# Patient Record
Sex: MALE | Race: WHITE | Employment: OTHER | URBAN - METROPOLITAN AREA
[De-identification: names, ages, dates, MRNs, and addresses within clinical notes are randomized per-mention and may not be internally consistent; named-entity substitution may affect disease eponyms.]

---

## 2022-12-12 ENCOUNTER — OFFICE VISIT (OUTPATIENT)
Dept: INTERNAL MEDICINE CLINIC | Age: 67
End: 2022-12-12
Payer: MEDICARE

## 2022-12-12 VITALS — HEIGHT: 65 IN

## 2022-12-12 DIAGNOSIS — Z00.00 WELL ADULT HEALTH CHECK: ICD-10-CM

## 2022-12-12 DIAGNOSIS — R41.3 MEMORY LOSS: Primary | ICD-10-CM

## 2022-12-12 DIAGNOSIS — I10 ESSENTIAL HYPERTENSION: ICD-10-CM

## 2022-12-12 DIAGNOSIS — H61.23 BILATERAL IMPACTED CERUMEN: ICD-10-CM

## 2022-12-12 PROCEDURE — G8427 DOCREV CUR MEDS BY ELIG CLIN: HCPCS | Performed by: INTERNAL MEDICINE

## 2022-12-12 PROCEDURE — G8484 FLU IMMUNIZE NO ADMIN: HCPCS | Performed by: INTERNAL MEDICINE

## 2022-12-12 PROCEDURE — 3017F COLORECTAL CA SCREEN DOC REV: CPT | Performed by: INTERNAL MEDICINE

## 2022-12-12 PROCEDURE — 1123F ACP DISCUSS/DSCN MKR DOCD: CPT | Performed by: INTERNAL MEDICINE

## 2022-12-12 PROCEDURE — G8421 BMI NOT CALCULATED: HCPCS | Performed by: INTERNAL MEDICINE

## 2022-12-12 PROCEDURE — 1036F TOBACCO NON-USER: CPT | Performed by: INTERNAL MEDICINE

## 2022-12-12 PROCEDURE — 99203 OFFICE O/P NEW LOW 30 MIN: CPT | Performed by: INTERNAL MEDICINE

## 2022-12-12 RX ORDER — TAMSULOSIN HYDROCHLORIDE 0.4 MG/1
CAPSULE ORAL
COMMUNITY
Start: 2022-10-11

## 2022-12-12 RX ORDER — LISINOPRIL 40 MG/1
TABLET ORAL
COMMUNITY
Start: 2022-10-11

## 2022-12-12 SDOH — ECONOMIC STABILITY: TRANSPORTATION INSECURITY
IN THE PAST 12 MONTHS, HAS LACK OF TRANSPORTATION KEPT YOU FROM MEETINGS, WORK, OR FROM GETTING THINGS NEEDED FOR DAILY LIVING?: NO

## 2022-12-12 SDOH — ECONOMIC STABILITY: FOOD INSECURITY: WITHIN THE PAST 12 MONTHS, THE FOOD YOU BOUGHT JUST DIDN'T LAST AND YOU DIDN'T HAVE MONEY TO GET MORE.: NEVER TRUE

## 2022-12-12 SDOH — ECONOMIC STABILITY: FOOD INSECURITY: WITHIN THE PAST 12 MONTHS, YOU WORRIED THAT YOUR FOOD WOULD RUN OUT BEFORE YOU GOT MONEY TO BUY MORE.: NEVER TRUE

## 2022-12-12 SDOH — ECONOMIC STABILITY: TRANSPORTATION INSECURITY
IN THE PAST 12 MONTHS, HAS THE LACK OF TRANSPORTATION KEPT YOU FROM MEDICAL APPOINTMENTS OR FROM GETTING MEDICATIONS?: NO

## 2022-12-12 ASSESSMENT — PATIENT HEALTH QUESTIONNAIRE - PHQ9
2. FEELING DOWN, DEPRESSED OR HOPELESS: 0
SUM OF ALL RESPONSES TO PHQ QUESTIONS 1-9: 0
SUM OF ALL RESPONSES TO PHQ QUESTIONS 1-9: 0
1. LITTLE INTEREST OR PLEASURE IN DOING THINGS: 0
SUM OF ALL RESPONSES TO PHQ QUESTIONS 1-9: 0
SUM OF ALL RESPONSES TO PHQ9 QUESTIONS 1 & 2: 0
SUM OF ALL RESPONSES TO PHQ QUESTIONS 1-9: 0

## 2022-12-12 ASSESSMENT — SOCIAL DETERMINANTS OF HEALTH (SDOH): HOW HARD IS IT FOR YOU TO PAY FOR THE VERY BASICS LIKE FOOD, HOUSING, MEDICAL CARE, AND HEATING?: NOT HARD AT ALL

## 2022-12-12 NOTE — PROGRESS NOTES
[address] [address]  Dept: [phone]  Dept Fax: [phone]    Syd Dupont is a 75 y.o. male who presents today for his medicalconditions/complaints as noted below. Syd Dupont is c/o of Memory Loss (Dementia for the past few years.), Foot Pain (Callus on the bottom of left foot. ), and Annual Exam ([facility] care. )      HPI:     Memory Loss    Patient reports onset of memory loss was more than 1 year ago (about two years). Onset quality is gradual.     Symptoms associated with memory loss include changes in short-term memory. Symptoms comments: Will forget where he is going. Telling time on analog clock, counting money. .     The family monitors medication usage. Medication administration comments: Wife gives him pills. Patient lives with spouse. Patient lives in a/an house. Foot Pain   The pain is present in the left foot (heel sole callous). This is a new problem. The current episode started more than 1 year ago. The problem occurs constantly. The quality of the pain is described as sharp. The pain is moderate. The symptoms are aggravated by activity. He has tried nothing for the symptoms. Hypertension  This is a chronic problem. The current episode started more than 1 year ago. The problem is unchanged. The problem is controlled. There are no associated agents to hypertension. Past treatments include ACE inhibitors and calcium channel blockers. The current treatment provides moderate improvement. Past Medical History:   Diagnosis Date    Hypertension         Current Outpatient Medications   Medication Sig Dispense Refill    lisinopril (PRINIVIL;ZESTRIL) 40 MG tablet       tamsulosin (FLOMAX) 0.4 MG capsule       verapamil (CALAN SR) 120 MG extended release tablet        No current facility-administered medications for this visit.      No Known Allergies    Health Maintenance   Topic Date Due    Depression Screen  Never done Hepatitis C screen  Never done    Lipids  Never done    Colorectal Cancer Screen  Never done    Shingles vaccine (1 of 2) Never done    DTaP/Tdap/Td vaccine (1 - Tdap) 09/21/2014    Pneumococcal 65+ years Vaccine (1 - PCV) Never done    COVID-19 Vaccine (2 - Booster for [employer] series) 06/05/2021    Annual Wellness Visit (AWV)  11/28/2022    Flu vaccine  Completed    Hepatitis A vaccine  Aged Out    Hib vaccine  Aged Out    Meningococcal (ACWY) vaccine  Aged Out       Subjective:      Review of Systems   Psychiatric/Behavioral:  Positive for memory loss. All other systems reviewed and are negative. Objective:     Physical Exam  Vitals reviewed. Constitutional:       Appearance: Normal appearance. He is well-developed. HENT:      Head: Normocephalic and atraumatic. Right Ear: There is impacted cerumen. Left Ear: There is impacted cerumen. Eyes:      Conjunctiva/sclera: Conjunctivae normal.      Pupils: Pupils are equal, round, and reactive to light. Neck:      Thyroid: No thyromegaly. Vascular: No JVD. Cardiovascular:      Rate and Rhythm: Normal rate and regular rhythm. Heart sounds: Normal heart sounds. No murmur heard. Pulmonary:      Effort: Pulmonary effort is normal.      Breath sounds: Normal breath sounds. Abdominal:      General: Bowel sounds are normal.      Palpations: Abdomen is soft. Musculoskeletal:         General: Normal range of motion. Cervical back: Normal range of motion and neck supple. Skin:     General: Skin is warm and dry. Neurological:      General: No focal deficit present. Mental Status: He is alert. Deep Tendon Reflexes: Reflexes are normal and symmetric. Comments: Doesn't know date, knows [place] is next holiday.  5' 4.75\" (1.645 m)       Assessment:       Diagnosis Orders   1. Memory loss  Sedimentation rate, automated    Folate    Vitamin B12    Tamica Daniels MD, Neurology, [place]      2.  Essential hypertension        3. Bilateral impacted cerumen        4. Well adult health check  Comprehensive Metabolic Panel    TSH with Reflex    VDRL          Plan:      No follow-ups on file. No orders of the defined types were placed in this encounter. Orders Placed This Encounter   Procedures    Comprehensive Metabolic Panel     Standing Status:   Future     Standing Expiration Date:   12/12/2023    TSH with Reflex     Standing Status:   Future     Standing Expiration Date:   12/12/2023    VDRL     Standing Status:   Future     Standing Expiration Date:   12/12/2023    Sedimentation rate, automated     Standing Status:   Future     Standing Expiration Date:   12/12/2023    Folate     Standing Status:   Future     Standing Expiration Date:   12/12/2023    Vitamin B12     Standing Status:   Future     Standing Expiration Date:   12/12/2023    Darlene Ulrich MD, Neurology, [place]     Referral Priority:   Routine     Referral Type:   Eval and Treat     Referral Reason:   Specialty Services Required     Referred to Provider:   Dora Primrose, MD     Requested Specialty:   Neurology     Number of Visits Requested:   1              Patient given educational materials - see patient instructions. Discussed use, benefit, and side effects of prescribed medications. All patientquestions answered. Pt voiced understanding.     Electronically signed by Nano Troy MD on 12/12/2022at 1:34 PM

## 2022-12-12 NOTE — PROGRESS NOTES
Visit Information    Have you changed or started any medications since your last visit including any over-the-counter medicines, vitamins, or herbal medicines? no   Are you having any side effects from any of your medications? -  no  Have you stopped taking any of your medications? Is so, why? -  no    Have you seen any other physician or provider since your last visit? No  Have you had any other diagnostic tests since your last visit? No  Have you been seen in the emergency room and/or had an admission to a hospital since we last saw you? No  Have you had your routine dental cleaning in the past 6 months? no    Have you activated your [employer] account? If not, what are your barriers?  Yes     Patient Care Team:  Daniel Andrade MD as PCP - General (Internal Medicine)    Medical History Review  Past Medical, Family, and Social History reviewed and does not contribute to the patient presenting condition    Health Maintenance   Topic Date Due    Depression Screen  Never done    Hepatitis C screen  Never done    Lipids  Never done    Colorectal Cancer Screen  Never done    Shingles vaccine (1 of 2) Never done    DTaP/Tdap/Td vaccine (1 - Tdap) 09/21/2014    Pneumococcal 65+ years Vaccine (1 - PCV) Never done    COVID-19 Vaccine (2 - Booster for [employer] series) 06/05/2021    Annual Wellness Visit (AWV)  11/28/2022    Flu vaccine  Completed    Hepatitis A vaccine  Aged Out    Hib vaccine  Aged Out    Meningococcal (ACWY) vaccine  Aged Out

## 2022-12-16 ENCOUNTER — NURSE ONLY (OUTPATIENT)
Dept: INTERNAL MEDICINE CLINIC | Age: 67
End: 2022-12-16
Payer: MEDICARE

## 2022-12-16 ENCOUNTER — HOSPITAL ENCOUNTER (OUTPATIENT)
Age: 67
Setting detail: SPECIMEN
Discharge: HOME | End: 2022-12-16

## 2022-12-16 VITALS — HEIGHT: 64 IN

## 2022-12-16 DIAGNOSIS — Z00.00 WELL ADULT HEALTH CHECK: ICD-10-CM

## 2022-12-16 DIAGNOSIS — R41.3 MEMORY LOSS: ICD-10-CM

## 2022-12-16 DIAGNOSIS — H61.23 BILATERAL IMPACTED CERUMEN: Primary | ICD-10-CM

## 2022-12-16 LAB — SEDIMENTATION RATE, ERYTHROCYTE: 1 MM/HR (ref 0–20)

## 2022-12-16 PROCEDURE — 69210 REMOVE IMPACTED EAR WAX UNI: CPT | Performed by: INTERNAL MEDICINE

## 2022-12-17 LAB
ALBUMIN SERPL-MCNC: 4.3 G/DL (ref 3.5–5.2)
ALBUMIN/GLOBULIN RATIO: 1.4 (ref 1–2.5)
ALP BLD-CCNC: 72 U/L (ref 40–129)
ALT SERPL-CCNC: 7 U/L (ref 5–41)
ANION GAP SERPL CALCULATED.3IONS-SCNC: 8 MMOL/L (ref 9–17)
AST SERPL-CCNC: 14 U/L
BILIRUB SERPL-MCNC: 1 MG/DL (ref 0.3–1.2)
BUN BLDV-MCNC: 6 MG/DL (ref 8–23)
CALCIUM SERPL-MCNC: 9.4 MG/DL (ref 8.6–10.4)
CHLORIDE BLD-SCNC: 96 MMOL/L (ref 98–107)
CO2: 30 MMOL/L (ref 20–31)
CREAT SERPL-MCNC: 0.71 MG/DL (ref 0.7–1.2)
FOLATE: 12.6 NG/ML
GFR SERPL CREATININE-BSD FRML MDRD: >60 ML/MIN/1.73M2
GLUCOSE BLD-MCNC: 86 MG/DL (ref 70–99)
POTASSIUM SERPL-SCNC: 4.3 MMOL/L (ref 3.7–5.3)
SODIUM BLD-SCNC: 134 MMOL/L (ref 135–144)
TOTAL PROTEIN: 7.3 G/DL (ref 6.4–8.3)
TSH SERPL DL<=0.05 MIU/L-ACNC: 0.86 UIU/ML (ref 0.3–5)
VITAMIN B-12: 462 PG/ML (ref 232–1245)

## 2023-01-09 ENCOUNTER — OFFICE VISIT (OUTPATIENT)
Dept: INTERNAL MEDICINE CLINIC | Age: 68
End: 2023-01-09
Payer: MEDICARE

## 2023-01-09 VITALS
WEIGHT: 163 LBS | BODY MASS INDEX: 27.98 KG/M2 | HEART RATE: 79 BPM | DIASTOLIC BLOOD PRESSURE: 86 MMHG | OXYGEN SATURATION: 97 % | SYSTOLIC BLOOD PRESSURE: 108 MMHG

## 2023-01-09 DIAGNOSIS — F03.90 DEMENTIA WITHOUT BEHAVIORAL DISTURBANCE, PSYCHOTIC DISTURBANCE, MOOD DISTURBANCE, OR ANXIETY, UNSPECIFIED DEMENTIA SEVERITY, UNSPECIFIED DEMENTIA TYPE (HCC): Primary | ICD-10-CM

## 2023-01-09 PROCEDURE — 1123F ACP DISCUSS/DSCN MKR DOCD: CPT | Performed by: INTERNAL MEDICINE

## 2023-01-09 PROCEDURE — 99213 OFFICE O/P EST LOW 20 MIN: CPT | Performed by: INTERNAL MEDICINE

## 2023-01-09 RX ORDER — RIVASTIGMINE TARTRATE 3 MG/1
3 CAPSULE ORAL 2 TIMES DAILY
Qty: 60 CAPSULE | Refills: 3 | Status: SHIPPED | OUTPATIENT
Start: 2023-01-09

## 2023-01-09 ASSESSMENT — PATIENT HEALTH QUESTIONNAIRE - PHQ9
SUM OF ALL RESPONSES TO PHQ9 QUESTIONS 1 & 2: 1
2. FEELING DOWN, DEPRESSED OR HOPELESS: 1
SUM OF ALL RESPONSES TO PHQ QUESTIONS 1-9: 1
1. LITTLE INTEREST OR PLEASURE IN DOING THINGS: 0

## 2023-01-09 NOTE — PROGRESS NOTES
Visit Information    Have you changed or started any medications since your last visit including any over-the-counter medicines, vitamins, or herbal medicines? no   Are you having any side effects from any of your medications? -  no  Have you stopped taking any of your medications? Is so, why? -  no    Have you seen any other physician or provider since your last visit? No  Have you had any other diagnostic tests since your last visit? No  Have you been seen in the emergency room and/or had an admission to a hospital since we last saw you? No  Have you had your routine dental cleaning in the past 6 months? no    Have you activated your [employer] account? If not, what are your barriers?  No:      Patient Care Team:  Isra Sumner MD as PCP - General (Internal Medicine)  Isra Sumner MD as PCP - [facility]    Medical History Review  Past Medical, Family, and Social History reviewed and does contribute to the patient presenting condition    Health Maintenance   Topic Date Due    Hepatitis C screen  Never done    Lipids  Never done    Colorectal Cancer Screen  Never done    Shingles vaccine (1 of 2) Never done    DTaP/Tdap/Td vaccine (1 - Tdap) 09/21/2014    Pneumococcal 65+ years Vaccine (1 - PCV) Never done    COVID-19 Vaccine (2 - Booster for [employer] series) 06/05/2021    Annual Wellness Visit (AWV)  Never done    Depression Screen  12/12/2023    Flu vaccine  Completed    Hepatitis A vaccine  Aged Out    Hib vaccine  Aged Out    Meningococcal (ACWY) vaccine  Aged Out

## 2023-01-09 NOTE — PROGRESS NOTES
[address] [address]  Dept: [phone]  Dept Fax: [phone]    Remi Desai is a 75 y.o. male who presents today for his medicalconditions/complaints as noted below. Remi Desai is c/o of Memory Loss (4 week f/u, handicap card, still haven't heard from Neuro )      HPI:     Memory Loss    Patient reports onset of memory loss was more than 1 year ago. Onset quality is gradual.     Symptoms associated with memory loss include changes in short-term memory and changes in long-term memory. Family and/or patient concerns for memory loss include medication errors. The family monitors medication usage. Hypertension  This is a chronic problem. The current episode started more than 1 year ago. The problem is unchanged. The problem is controlled. There are no known risk factors for coronary artery disease. Past treatments include ACE inhibitors and calcium channel blockers. The current treatment provides significant improvement. There are no compliance problems. Past Medical History:   Diagnosis Date    Hypertension         Current Outpatient Medications   Medication Sig Dispense Refill    rivastigmine (EXELON) 3 MG capsule Take 1 capsule by mouth 2 times daily 60 capsule 3    lisinopril (PRINIVIL;ZESTRIL) 40 MG tablet       tamsulosin (FLOMAX) 0.4 MG capsule       verapamil (CALAN SR) 120 MG extended release tablet        No current facility-administered medications for this visit.      No Known Allergies    Health Maintenance   Topic Date Due    Hepatitis C screen  Never done    Lipids  Never done    Colorectal Cancer Screen  Never done    Shingles vaccine (1 of 2) Never done    DTaP/Tdap/Td vaccine (1 - Tdap) 09/21/2014    Pneumococcal 65+ years Vaccine (1 - PCV) Never done    COVID-19 Vaccine (2 - Booster for [employer] series) 06/05/2021    Annual Wellness Visit (AWV)  Never done    Depression Screen  12/12/2023    Flu vaccine Completed    Hepatitis A vaccine  Aged Out    Hib vaccine  Aged Out    Meningococcal (ACWY) vaccine  Aged Out       Subjective:      Review of Systems   Psychiatric/Behavioral:  Positive for memory loss. All other systems reviewed and are negative. Objective:     Physical Exam  Vitals reviewed. Constitutional:       Appearance: He is well-developed. HENT:      Head: Normocephalic and atraumatic. Eyes:      Conjunctiva/sclera: Conjunctivae normal.      Pupils: Pupils are equal, round, and reactive to light. Neck:      Thyroid: No thyromegaly. Vascular: No JVD. Cardiovascular:      Rate and Rhythm: Normal rate and regular rhythm. Heart sounds: Normal heart sounds. No murmur heard. Pulmonary:      Effort: Pulmonary effort is normal.      Breath sounds: Normal breath sounds. Abdominal:      General: Bowel sounds are normal.      Palpations: Abdomen is soft. Musculoskeletal:         General: Normal range of motion. Cervical back: Normal range of motion and neck supple. Skin:     General: Skin is warm and dry. Neurological:      Mental Status: He is alert. He is disoriented. Deep Tendon Reflexes: Reflexes are normal and symmetric. /86 (Site: Right Upper Arm, Position: Sitting)   Pulse 79   Wt 163 lb (73.9 kg)   SpO2 97%   BMI 27.98 kg/m²       Assessment:       Diagnosis Orders   1. Dementia without behavioral disturbance, psychotic disturbance, mood disturbance, or anxiety, unspecified dementia severity, unspecified dementia type (HCC)  rivastigmine (EXELON) 3 MG capsule          Plan:      No follow-ups on file. Orders Placed This Encounter   Medications    rivastigmine (EXELON) 3 MG capsule     Sig: Take 1 capsule by mouth 2 times daily     Dispense:  60 capsule     Refill:  3     No orders of the defined types were placed in this encounter. Note for handicap parking. Patient given educational materials - see patient instructions. Discussed use, benefit, and side effects of prescribed medications. All patientquestions answered. Pt voiced understanding.     Electronically signed by Armando Manning MD on 1/9/2023at 1:46 PM

## 2023-06-05 DIAGNOSIS — F03.90 DEMENTIA WITHOUT BEHAVIORAL DISTURBANCE, PSYCHOTIC DISTURBANCE, MOOD DISTURBANCE, OR ANXIETY, UNSPECIFIED DEMENTIA SEVERITY, UNSPECIFIED DEMENTIA TYPE (HCC): ICD-10-CM

## 2023-06-05 RX ORDER — TAMSULOSIN HYDROCHLORIDE 0.4 MG/1
0.4 CAPSULE ORAL DAILY
Qty: 30 CAPSULE | Refills: 3 | Status: SHIPPED | OUTPATIENT
Start: 2023-06-05

## 2023-06-05 RX ORDER — RIVASTIGMINE TARTRATE 3 MG/1
3 CAPSULE ORAL 2 TIMES DAILY
Qty: 60 CAPSULE | Refills: 3 | Status: SHIPPED | OUTPATIENT
Start: 2023-06-05

## 2023-07-13 ENCOUNTER — HOSPITAL ENCOUNTER (OUTPATIENT)
Age: 68
Setting detail: SPECIMEN
Discharge: HOME | End: 2023-07-13

## 2023-07-13 ENCOUNTER — OFFICE VISIT (OUTPATIENT)
Dept: INTERNAL MEDICINE CLINIC | Age: 68
End: 2023-07-13
Payer: MEDICARE

## 2023-07-13 VITALS
HEART RATE: 51 BPM | DIASTOLIC BLOOD PRESSURE: 86 MMHG | WEIGHT: 147 LBS | OXYGEN SATURATION: 97 % | BODY MASS INDEX: 25.1 KG/M2 | HEIGHT: 64 IN | SYSTOLIC BLOOD PRESSURE: 128 MMHG

## 2023-07-13 DIAGNOSIS — Z13.220 SCREENING FOR HYPERLIPIDEMIA: ICD-10-CM

## 2023-07-13 DIAGNOSIS — Z23 NEED FOR PROPHYLACTIC VACCINATION AND INOCULATION AGAINST VARICELLA: ICD-10-CM

## 2023-07-13 DIAGNOSIS — Z12.11 SCREENING FOR COLORECTAL CANCER: ICD-10-CM

## 2023-07-13 DIAGNOSIS — F32.9 REACTIVE DEPRESSION: ICD-10-CM

## 2023-07-13 DIAGNOSIS — Z12.12 SCREENING FOR COLORECTAL CANCER: ICD-10-CM

## 2023-07-13 DIAGNOSIS — Z00.00 INITIAL MEDICARE ANNUAL WELLNESS VISIT: Primary | ICD-10-CM

## 2023-07-13 LAB
CHOLEST SERPL-MCNC: 130 MG/DL
CHOLESTEROL/HDL RATIO: 2.7
HDLC SERPL-MCNC: 48 MG/DL
LDLC SERPL CALC-MCNC: 76 MG/DL (ref 0–130)
TRIGL SERPL-MCNC: 31 MG/DL

## 2023-07-13 PROCEDURE — 1123F ACP DISCUSS/DSCN MKR DOCD: CPT | Performed by: INTERNAL MEDICINE

## 2023-07-13 PROCEDURE — G0438 PPPS, INITIAL VISIT: HCPCS | Performed by: INTERNAL MEDICINE

## 2023-07-13 RX ORDER — ASPIRIN 81 MG/1
81 TABLET ORAL DAILY
COMMUNITY

## 2023-07-13 RX ORDER — PAROXETINE 10 MG/1
TABLET, FILM COATED ORAL
Qty: 30 TABLET | Refills: 3 | Status: SHIPPED | OUTPATIENT
Start: 2023-07-13

## 2023-07-13 ASSESSMENT — PATIENT HEALTH QUESTIONNAIRE - PHQ9
SUM OF ALL RESPONSES TO PHQ QUESTIONS 1-9: 0
SUM OF ALL RESPONSES TO PHQ QUESTIONS 1-9: 0
1. LITTLE INTEREST OR PLEASURE IN DOING THINGS: 0
10. IF YOU CHECKED OFF ANY PROBLEMS, HOW DIFFICULT HAVE THESE PROBLEMS MADE IT FOR YOU TO DO YOUR WORK, TAKE CARE OF THINGS AT HOME, OR GET ALONG WITH OTHER PEOPLE: 0
9. THOUGHTS THAT YOU WOULD BE BETTER OFF DEAD, OR OF HURTING YOURSELF: 0
SUM OF ALL RESPONSES TO PHQ9 QUESTIONS 1 & 2: 0
6. FEELING BAD ABOUT YOURSELF - OR THAT YOU ARE A FAILURE OR HAVE LET YOURSELF OR YOUR FAMILY DOWN: 0
SUM OF ALL RESPONSES TO PHQ QUESTIONS 1-9: 0
5. POOR APPETITE OR OVEREATING: 0
2. FEELING DOWN, DEPRESSED OR HOPELESS: 0
7. TROUBLE CONCENTRATING ON THINGS, SUCH AS READING THE NEWSPAPER OR WATCHING TELEVISION: 0
8. MOVING OR SPEAKING SO SLOWLY THAT OTHER PEOPLE COULD HAVE NOTICED. OR THE OPPOSITE, BEING SO FIGETY OR RESTLESS THAT YOU HAVE BEEN MOVING AROUND A LOT MORE THAN USUAL: 0
SUM OF ALL RESPONSES TO PHQ QUESTIONS 1-9: 0
3. TROUBLE FALLING OR STAYING ASLEEP: 0
4. FEELING TIRED OR HAVING LITTLE ENERGY: 0

## 2023-07-13 NOTE — PROGRESS NOTES
Visit Information    Have you changed or started any medications since your last visit including any over-the-counter medicines, vitamins, or herbal medicines? no   Are you having any side effects from any of your medications? -  no  Have you stopped taking any of your medications? Is so, why? -  no    Have you seen any other physician or provider since your last visit? No  Have you had any other diagnostic tests since your last visit? No  Have you been seen in the emergency room and/or had an admission to a hospital since we last saw you? No  Have you had your routine dental cleaning in the past 6 months? no    Have you activated your [employer] account? If not, what are your barriers?  Yes     Patient Care Team:  Verónica Duran MD as PCP - General (Internal Medicine)  Verónica Duran MD as PCP - Empaneled Provider    Medical History Review  Past Medical, Family, and Social History reviewed and does contribute to the patient presenting condition    Health Maintenance   Topic Date Due    Hepatitis C screen  Never done    Lipids  Never done    Colorectal Cancer Screen  Never done    Shingles vaccine (1 of 2) Never done    DTaP/Tdap/Td vaccine (1 - Tdap) 09/21/2014    Pneumococcal 65+ years Vaccine (1 - PCV) Never done    COVID-19 Vaccine (2 - Booster for [employer] series) 06/05/2021    Annual Wellness Visit (AWV)  Never done    Flu vaccine (1) 08/01/2023    Depression Monitoring  01/09/2024    Hepatitis A vaccine  Aged Out    Hib vaccine  Aged Out    Meningococcal (ACWY) vaccine  Aged Out    Depression Screen  Discontinued

## 2023-07-13 NOTE — PROGRESS NOTES
Medicare Annual Wellness Visit    Irven Barthel is here for Medicare AWV (Here for a wellness visit)    Assessment & Plan   Initial Medicare annual wellness visit  Need for prophylactic vaccination and inoculation against varicella  -     zoster recombinant adjuvanted vaccine [facility]) 50 MCG/0.5ML SUSR injection; Inject 0.5 mLs into the muscle once for 1 dose, Disp-0.5 mL, R-0Print  Screening for colorectal cancer  -     FIT-DNA (Cologuard)  Screening for hyperlipidemia  -     Lipid Panel; Future  Recommendations for Preventive Services Due: see orders and patient instructions/AVS.  Recommended screening schedule for the next 5-10 years is provided to the patient in written form: see Patient Instructions/AVS.     Return in about 3 months (around 10/13/2023). Subjective   The following acute and/or chronic problems were also addressed today:    Patient's complete Health Risk Assessment and screening values have been reviewed and are found in Flowsheets. The following problems were reviewed today and where indicated follow up appointments were made and/or referrals ordered. Positive Risk Factor Screenings with Interventions:       Cognitive:    Words recalled: 0 Words Recalled   Clock Drawing Test (CDT):  (patient does not know how to read a manual clock)       Total Score Interpretation: Abnormal Mini-Cog      Interventions:  Patient declines any further evaluation or treatment  Taking exelon wife feels it may help      Drug Use:          Interpretation:  1-2: Low level - Monitor, re-assess at a later date  3-5: Moderate level - Further Investigation  6-8: Substantial level - Intensive Assessment  9-10: Severe level - Intensive Assessment    Interventions:  Patient declined any further intervention or treatment           Dentist Screen:  Have you seen the dentist within the past year?: (!) No    Intervention:  Advised to schedule with their dentist     Vision Screen:  Do you have difficulty driving,

## 2023-08-15 DIAGNOSIS — F32.9 REACTIVE DEPRESSION: ICD-10-CM

## 2023-08-15 RX ORDER — PAROXETINE 10 MG/1
10 TABLET, FILM COATED ORAL DAILY
Qty: 30 TABLET | Refills: 3 | Status: SHIPPED | OUTPATIENT
Start: 2023-08-15

## 2023-10-03 ENCOUNTER — OFFICE VISIT (OUTPATIENT)
Dept: INTERNAL MEDICINE CLINIC | Age: 68
End: 2023-10-03
Payer: MEDICARE

## 2023-10-03 VITALS
WEIGHT: 146 LBS | DIASTOLIC BLOOD PRESSURE: 90 MMHG | OXYGEN SATURATION: 98 % | BODY MASS INDEX: 24.92 KG/M2 | HEIGHT: 64 IN | HEART RATE: 56 BPM | SYSTOLIC BLOOD PRESSURE: 172 MMHG

## 2023-10-03 DIAGNOSIS — Z12.5 PROSTATE CANCER SCREENING: ICD-10-CM

## 2023-10-03 DIAGNOSIS — Z13.29 SCREENING FOR ENDOCRINE DISORDER: ICD-10-CM

## 2023-10-03 DIAGNOSIS — Z11.59 NEED FOR HEPATITIS C SCREENING TEST: ICD-10-CM

## 2023-10-03 DIAGNOSIS — F33.9 RECURRENT MAJOR DEPRESSIVE DISORDER, REMISSION STATUS UNSPECIFIED (HCC): ICD-10-CM

## 2023-10-03 DIAGNOSIS — I10 HYPERTENSION GOAL BP (BLOOD PRESSURE) < 130/80: Primary | ICD-10-CM

## 2023-10-03 DIAGNOSIS — Z13.220 LIPID SCREENING: ICD-10-CM

## 2023-10-03 DIAGNOSIS — F03.90 DEMENTIA WITHOUT BEHAVIORAL DISTURBANCE, PSYCHOTIC DISTURBANCE, MOOD DISTURBANCE, OR ANXIETY, UNSPECIFIED DEMENTIA SEVERITY, UNSPECIFIED DEMENTIA TYPE (HCC): ICD-10-CM

## 2023-10-03 PROBLEM — F33.1 MAJOR DEPRESSIVE DISORDER, RECURRENT, MODERATE (HCC): Status: ACTIVE | Noted: 2023-10-03

## 2023-10-03 PROBLEM — F33.0 MAJOR DEPRESSIVE DISORDER, RECURRENT, MILD (HCC): Status: ACTIVE | Noted: 2023-10-03

## 2023-10-03 PROCEDURE — 3074F SYST BP LT 130 MM HG: CPT | Performed by: NURSE PRACTITIONER

## 2023-10-03 PROCEDURE — 99214 OFFICE O/P EST MOD 30 MIN: CPT | Performed by: NURSE PRACTITIONER

## 2023-10-03 PROCEDURE — 1123F ACP DISCUSS/DSCN MKR DOCD: CPT | Performed by: NURSE PRACTITIONER

## 2023-10-03 PROCEDURE — 3078F DIAST BP <80 MM HG: CPT | Performed by: NURSE PRACTITIONER

## 2023-10-03 RX ORDER — RIVASTIGMINE TARTRATE 3 MG/1
3 CAPSULE ORAL 2 TIMES DAILY
Qty: 60 CAPSULE | Refills: 2 | Status: SHIPPED | OUTPATIENT
Start: 2023-10-03

## 2023-10-03 ASSESSMENT — ENCOUNTER SYMPTOMS
EYE DISCHARGE: 0
SHORTNESS OF BREATH: 0
ABDOMINAL PAIN: 0
DIARRHEA: 0
SORE THROAT: 0
WHEEZING: 0
COUGH: 0
CONSTIPATION: 0
TROUBLE SWALLOWING: 0

## 2023-10-03 NOTE — PROGRESS NOTES
Visit Information    Have you changed or started any medications since your last visit including any over-the-counter medicines, vitamins, or herbal medicines? no   Are you having any side effects from any of your medications? -  no  Have you stopped taking any of your medications? Is so, why? -  no    Have you seen any other physician or provider since your last visit? No  Have you had any other diagnostic tests since your last visit? No  Have you been seen in the emergency room and/or had an admission to a hospital since we last saw you? No  Have you had your routine dental cleaning in the past 6 months? no    Have you activated your [employer] account? If not, what are your barriers?  Yes     Patient Care Team:  Sp Kahn MD as PCP - General (Internal Medicine)  [facility]    Medical History Review  Past Medical, Family, and Social History reviewed and does contribute to the patient presenting condition    Health Maintenance   Topic Date Due    Hepatitis C screen  Never done    Colorectal Cancer Screen  Never done    Shingles vaccine (1 of 2) Never done    DTaP/Tdap/Td vaccine (1 - Tdap) 09/21/2014    Pneumococcal 65+ years Vaccine (1 - PCV) Never done    COVID-19 Vaccine (2 - Booster for [employer] series) 06/05/2021    Flu vaccine (1) 08/01/2023    Depression Monitoring  07/13/2024    Annual Wellness Visit (AWV)  07/13/2024    Lipids  07/13/2028    Hepatitis A vaccine  Aged Out    Hepatitis B vaccine  Aged Out    Hib vaccine  Aged Out    Meningococcal (ACWY) vaccine  Aged Out    Depression Screen  Discontinued        [address] [address]  Dept: [phone]  Dept Fax: [phone]    Office Progress/Follow Up Note  Date of patient's visit: 10/3/2023   Patient's Name:  Maritza Morrison  YOB: 1955            Patient Care Team:  Sp Kahn MD as PCP - General (Internal Medicine)  Rossi Reis

## 2023-10-04 RX ORDER — TAMSULOSIN HYDROCHLORIDE 0.4 MG/1
0.4 CAPSULE ORAL DAILY
Qty: 30 CAPSULE | Refills: 3 | Status: SHIPPED | OUTPATIENT
Start: 2023-10-04

## 2023-10-11 PROBLEM — F10.20 ALCOHOLISM (HCC): Status: ACTIVE | Noted: 2017-09-29

## 2023-10-11 PROBLEM — S22.22XA CLOSED FRACTURE OF BODY OF STERNUM: Status: ACTIVE | Noted: 2020-11-21
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
## 2023-10-11 PROBLEM — S22.43XA MULTIPLE FRACTURES OF RIBS, BILATERAL, INITIAL ENCOUNTER FOR CLOSED FRACTURE: Status: ACTIVE | Noted: 2020-11-21

## 2023-10-11 PROBLEM — S22.43XA MULTIPLE FRACTURES OF RIBS, BILATERAL, INITIAL ENCOUNTER FOR CLOSED FRACTURE: Status: RESOLVED | Noted: 2020-11-21 | Resolved: 2023-10-11

## 2023-10-11 PROBLEM — S22.22XA CLOSED FRACTURE OF BODY OF STERNUM: Status: RESOLVED | Noted: 2020-11-21 | Resolved: 2023-10-11

## 2023-10-11 PROBLEM — F03.90 DEMENTIA (HCC): Status: ACTIVE | Noted: 2021-05-14

## 2023-10-11 PROBLEM — I45.10 UNSPECIFIED RIGHT BUNDLE-BRANCH BLOCK: Status: ACTIVE | Noted: 2021-04-02
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
## 2023-10-11 PROBLEM — N40.1 BPH WITH OBSTRUCTION/LOWER URINARY TRACT SYMPTOMS: Status: ACTIVE | Noted: 2021-04-22

## 2023-10-11 PROBLEM — F33.0 MAJOR DEPRESSIVE DISORDER, RECURRENT, MILD (HCC): Status: RESOLVED | Noted: 2023-10-03 | Resolved: 2023-10-11

## 2023-10-11 PROBLEM — F33.9 MAJOR DEPRESSIVE DISORDER, RECURRENT, UNSPECIFIED (HCC): Status: RESOLVED | Noted: 2023-10-03 | Resolved: 2023-10-11

## 2023-10-11 PROBLEM — F10.20 ALCOHOLISM (HCC): Status: RESOLVED | Noted: 2017-09-29 | Resolved: 2023-10-11

## 2023-10-11 PROBLEM — N13.8 BPH WITH OBSTRUCTION/LOWER URINARY TRACT SYMPTOMS: Status: ACTIVE | Noted: 2021-04-22

## 2023-11-16 DIAGNOSIS — F32.9 REACTIVE DEPRESSION: ICD-10-CM

## 2023-11-16 RX ORDER — PAROXETINE 10 MG/1
10 TABLET, FILM COATED ORAL DAILY
Qty: 90 TABLET | Refills: 3 | Status: SHIPPED | OUTPATIENT
Start: 2023-11-16

## 2023-11-30 ENCOUNTER — TELEPHONE (OUTPATIENT)
Dept: INTERNAL MEDICINE CLINIC | Age: 68
End: 2023-11-30

## 2023-12-15 ENCOUNTER — HOSPITAL ENCOUNTER (OUTPATIENT)
Age: 68
Setting detail: SPECIMEN
Discharge: HOME | End: 2023-12-15

## 2023-12-15 ENCOUNTER — TELEPHONE (OUTPATIENT)
Dept: INTERNAL MEDICINE CLINIC | Age: 68
End: 2023-12-15

## 2023-12-15 DIAGNOSIS — Z13.29 SCREENING FOR ENDOCRINE DISORDER: ICD-10-CM

## 2023-12-15 DIAGNOSIS — Z11.59 NEED FOR HEPATITIS C SCREENING TEST: ICD-10-CM

## 2023-12-15 DIAGNOSIS — F10.27 DEMENTIA ASSOCIATED WITH ALCOHOLISM WITHOUT BEHAVIORAL DISTURBANCE (HCC): ICD-10-CM

## 2023-12-15 DIAGNOSIS — I10 HYPERTENSION GOAL BP (BLOOD PRESSURE) < 130/80: ICD-10-CM

## 2023-12-15 DIAGNOSIS — Z12.5 PROSTATE CANCER SCREENING: ICD-10-CM

## 2023-12-15 DIAGNOSIS — Z13.220 LIPID SCREENING: ICD-10-CM

## 2023-12-15 LAB
ALBUMIN SERPL-MCNC: 4.6 G/DL (ref 3.5–5.2)
ALBUMIN/GLOB SERPL: 1.4 {RATIO} (ref 1–2.5)
ALP SERPL-CCNC: 75 U/L (ref 40–129)
ALT SERPL-CCNC: 7 U/L (ref 5–41)
ANION GAP SERPL CALCULATED.3IONS-SCNC: 10 MMOL/L (ref 9–17)
AST SERPL-CCNC: 12 U/L
BASOPHILS # BLD: 0.06 K/UL (ref 0–0.2)
BASOPHILS NFR BLD: 1 % (ref 0–2)
BILIRUB SERPL-MCNC: 0.6 MG/DL (ref 0.3–1.2)
BUN SERPL-MCNC: 18 MG/DL (ref 8–23)
CALCIUM SERPL-MCNC: 9.6 MG/DL (ref 8.6–10.4)
CHLORIDE SERPL-SCNC: 100 MMOL/L (ref 98–107)
CHOLEST SERPL-MCNC: 153 MG/DL
CHOLESTEROL/HDL RATIO: 3.1
CO2 SERPL-SCNC: 29 MMOL/L (ref 20–31)
CREAT SERPL-MCNC: 0.7 MG/DL (ref 0.7–1.2)
EOSINOPHIL # BLD: 0.12 K/UL (ref 0–0.44)
EOSINOPHILS RELATIVE PERCENT: 1 % (ref 1–4)
ERYTHROCYTE [DISTWIDTH] IN BLOOD BY AUTOMATED COUNT: 13.3 % (ref 11.8–14.4)
GFR SERPL CREATININE-BSD FRML MDRD: >60 ML/MIN/1.73M2
GLUCOSE SERPL-MCNC: 90 MG/DL (ref 70–99)
HCT VFR BLD AUTO: 45.8 % (ref 40.7–50.3)
HDLC SERPL-MCNC: 50 MG/DL
HGB BLD-MCNC: 15.7 G/DL (ref 13–17)
IMM GRANULOCYTES # BLD AUTO: 0.03 K/UL (ref 0–0.3)
IMM GRANULOCYTES NFR BLD: 0 %
LDLC SERPL CALC-MCNC: 94 MG/DL (ref 0–130)
LYMPHOCYTES NFR BLD: 2.6 K/UL (ref 1.1–3.7)
LYMPHOCYTES RELATIVE PERCENT: 28 % (ref 24–43)
MCH RBC QN AUTO: 33.1 PG (ref 25.2–33.5)
MCHC RBC AUTO-ENTMCNC: 34.3 G/DL (ref 28.4–34.8)
MCV RBC AUTO: 96.4 FL (ref 82.6–102.9)
MONOCYTES NFR BLD: 0.67 K/UL (ref 0.1–1.2)
MONOCYTES NFR BLD: 7 % (ref 3–12)
NEUTROPHILS NFR BLD: 63 % (ref 36–65)
NEUTS SEG NFR BLD: 5.71 K/UL (ref 1.5–8.1)
NRBC BLD-RTO: 0 PER 100 WBC
PLATELET # BLD AUTO: 238 K/UL (ref 138–453)
PMV BLD AUTO: 9.7 FL (ref 8.1–13.5)
POTASSIUM SERPL-SCNC: 4 MMOL/L (ref 3.7–5.3)
PROT SERPL-MCNC: 8 G/DL (ref 6.4–8.3)
RBC # BLD AUTO: 4.75 M/UL (ref 4.21–5.77)
SODIUM SERPL-SCNC: 139 MMOL/L (ref 135–144)
TRIGL SERPL-MCNC: 46 MG/DL
TSH SERPL DL<=0.05 MIU/L-ACNC: 0.9 UIU/ML (ref 0.3–5)
WBC OTHER # BLD: 9.2 K/UL (ref 3.5–11.3)

## 2023-12-15 NOTE — TELEPHONE ENCOUNTER
Patient stopped in after he seen Neurology. They told him to check in with his PCP because his BP was running high there. States they took it 3 times it was over 200's with the last one being 190/122. He is currently taking BP medication but missed the one he takes at night last night.     Advised to go to ER to appt available

## 2023-12-16 LAB
HCV AB SERPL QL IA: NONREACTIVE
PSA SERPL-MCNC: 2.11 NG/ML

## 2023-12-17 LAB
FOLATE SERPL-MCNC: 13.1 NG/ML
T PALLIDUM AB SER QL IA: NONREACTIVE
VIT B12 SERPL-MCNC: 413 PG/ML (ref 232–1245)

## 2024-01-04 ENCOUNTER — OFFICE VISIT (OUTPATIENT)
Dept: INTERNAL MEDICINE CLINIC | Age: 69
End: 2024-01-04
Payer: MEDICARE

## 2024-01-04 VITALS
SYSTOLIC BLOOD PRESSURE: 150 MMHG | OXYGEN SATURATION: 96 % | BODY MASS INDEX: 25.78 KG/M2 | WEIGHT: 151 LBS | DIASTOLIC BLOOD PRESSURE: 100 MMHG | HEIGHT: 64 IN | HEART RATE: 71 BPM

## 2024-01-04 DIAGNOSIS — L84 CALLUS: ICD-10-CM

## 2024-01-04 DIAGNOSIS — F10.27 DEMENTIA ASSOCIATED WITH ALCOHOLISM WITHOUT BEHAVIORAL DISTURBANCE (HCC): ICD-10-CM

## 2024-01-04 DIAGNOSIS — F33.1 MAJOR DEPRESSIVE DISORDER, RECURRENT, MODERATE (HCC): ICD-10-CM

## 2024-01-04 DIAGNOSIS — I10 HYPERTENSION GOAL BP (BLOOD PRESSURE) < 130/80: Primary | ICD-10-CM

## 2024-01-04 DIAGNOSIS — N40.0 BENIGN PROSTATIC HYPERPLASIA, UNSPECIFIED WHETHER LOWER URINARY TRACT SYMPTOMS PRESENT: ICD-10-CM

## 2024-01-04 DIAGNOSIS — M79.672 LEFT FOOT PAIN: ICD-10-CM

## 2024-01-04 PROCEDURE — 3080F DIAST BP >= 90 MM HG: CPT | Performed by: NURSE PRACTITIONER

## 2024-01-04 PROCEDURE — 1123F ACP DISCUSS/DSCN MKR DOCD: CPT | Performed by: NURSE PRACTITIONER

## 2024-01-04 PROCEDURE — 99214 OFFICE O/P EST MOD 30 MIN: CPT | Performed by: NURSE PRACTITIONER

## 2024-01-04 PROCEDURE — 3077F SYST BP >= 140 MM HG: CPT | Performed by: NURSE PRACTITIONER

## 2024-01-04 RX ORDER — TAMSULOSIN HYDROCHLORIDE 0.4 MG/1
0.4 CAPSULE ORAL DAILY
Qty: 30 CAPSULE | Refills: 5 | Status: SHIPPED | OUTPATIENT
Start: 2024-01-04

## 2024-01-04 RX ORDER — HYDROCHLOROTHIAZIDE 25 MG/1
25 TABLET ORAL EVERY MORNING
Qty: 30 TABLET | Refills: 5 | Status: SHIPPED | OUTPATIENT
Start: 2024-01-04

## 2024-01-04 ASSESSMENT — PATIENT HEALTH QUESTIONNAIRE - PHQ9
9. THOUGHTS THAT YOU WOULD BE BETTER OFF DEAD, OR OF HURTING YOURSELF: 0
10. IF YOU CHECKED OFF ANY PROBLEMS, HOW DIFFICULT HAVE THESE PROBLEMS MADE IT FOR YOU TO DO YOUR WORK, TAKE CARE OF THINGS AT HOME, OR GET ALONG WITH OTHER PEOPLE: 0
7. TROUBLE CONCENTRATING ON THINGS, SUCH AS READING THE NEWSPAPER OR WATCHING TELEVISION: 0
6. FEELING BAD ABOUT YOURSELF - OR THAT YOU ARE A FAILURE OR HAVE LET YOURSELF OR YOUR FAMILY DOWN: 0
SUM OF ALL RESPONSES TO PHQ QUESTIONS 1-9: 0
4. FEELING TIRED OR HAVING LITTLE ENERGY: 0
5. POOR APPETITE OR OVEREATING: 0
2. FEELING DOWN, DEPRESSED OR HOPELESS: 0
SUM OF ALL RESPONSES TO PHQ QUESTIONS 1-9: 0
SUM OF ALL RESPONSES TO PHQ QUESTIONS 1-9: 0
8. MOVING OR SPEAKING SO SLOWLY THAT OTHER PEOPLE COULD HAVE NOTICED. OR THE OPPOSITE, BEING SO FIGETY OR RESTLESS THAT YOU HAVE BEEN MOVING AROUND A LOT MORE THAN USUAL: 0
SUM OF ALL RESPONSES TO PHQ QUESTIONS 1-9: 0
3. TROUBLE FALLING OR STAYING ASLEEP: 0
1. LITTLE INTEREST OR PLEASURE IN DOING THINGS: 0
SUM OF ALL RESPONSES TO PHQ9 QUESTIONS 1 & 2: 0

## 2024-01-04 NOTE — PROGRESS NOTES
Visit Information    Have you changed or started any medications since your last visit including any over-the-counter medicines, vitamins, or herbal medicines? no   Are you having any side effects from any of your medications? -  no  Have you stopped taking any of your medications? Is so, why? -  no    Have you seen any other physician or provider since your last visit? Yes - Records Obtained  Have you had any other diagnostic tests since your last visit? Yes - Records Obtained  Have you been seen in the emergency room and/or had an admission to a hospital since we last saw you? No  Have you had your routine dental cleaning in the past 6 months? no    Have you activated your [employer] account? If not, what are your barriers? Yes     Patient Care Team:  Shirley Mitchell APRN - CNP as PCP - General (Internal Medicine)  Shirley Mitchell APRN - CNP as PCP - Empaneled Provider    Medical History Review  Past Medical, Family, and Social History reviewed and does contribute to the patient presenting condition    Health Maintenance   Topic Date Due   • Colorectal Cancer Screen  Never done   • Respiratory Syncytial Virus (RSV) Pregnant or age 60 yrs+ (1 - 1-dose 60+ series) Never done   • DTaP/Tdap/Td vaccine (1 - Tdap) 09/21/2014   • Pneumococcal 65+ years Vaccine (1 - PCV) Never done   • Shingles vaccine (2 of 2) 12/05/2023   • Annual Wellness Visit (Medicare Advantage)  01/01/2024   • Depression Monitoring  01/04/2025   • Lipids  12/15/2028   • Flu vaccine  Completed   • COVID-19 Vaccine  Completed   • Hepatitis C screen  Completed   • Hepatitis A vaccine  Aged Out   • Hepatitis B vaccine  Aged Out   • Hib vaccine  Aged Out   • Polio vaccine  Aged Out   • Meningococcal (ACWY) vaccine  Aged Out   • Depression Screen  Discontinued        [facility]  [address] [address]  Dept: [phone]  Dept Fax: [phone]    Office Progress/Follow Up Note  Date of patient's visit: 1/4/2024

## 2024-01-25 ENCOUNTER — OFFICE VISIT (OUTPATIENT)
Dept: PODIATRY | Age: 69
End: 2024-01-25

## 2024-01-25 VITALS — BODY MASS INDEX: 25.78 KG/M2 | HEIGHT: 64 IN | WEIGHT: 151 LBS

## 2024-01-25 DIAGNOSIS — L98.9 BENIGN SKIN LESION: Primary | ICD-10-CM

## 2024-01-25 DIAGNOSIS — M79.672 PAIN IN LEFT FOOT: ICD-10-CM

## 2024-01-25 ASSESSMENT — ENCOUNTER SYMPTOMS
DIARRHEA: 0
NAUSEA: 0
BACK PAIN: 0
SHORTNESS OF BREATH: 0
COLOR CHANGE: 0
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
## 2024-02-06 ENCOUNTER — OFFICE VISIT (OUTPATIENT)
Dept: INTERNAL MEDICINE CLINIC | Age: 69
End: 2024-02-06

## 2024-02-06 VITALS
SYSTOLIC BLOOD PRESSURE: 136 MMHG | HEART RATE: 56 BPM | BODY MASS INDEX: 25.61 KG/M2 | HEIGHT: 64 IN | OXYGEN SATURATION: 98 % | DIASTOLIC BLOOD PRESSURE: 88 MMHG | WEIGHT: 150 LBS

## 2024-02-06 DIAGNOSIS — Z23 NEED FOR PNEUMOCOCCAL VACCINE: ICD-10-CM

## 2024-02-06 DIAGNOSIS — F03.90 DEMENTIA WITHOUT BEHAVIORAL DISTURBANCE, PSYCHOTIC DISTURBANCE, MOOD DISTURBANCE, OR ANXIETY, UNSPECIFIED DEMENTIA SEVERITY, UNSPECIFIED DEMENTIA TYPE (HCC): ICD-10-CM

## 2024-02-06 DIAGNOSIS — I10 HYPERTENSION, UNSPECIFIED TYPE: Primary | ICD-10-CM

## 2024-02-06 NOTE — PROGRESS NOTES
Visit Information    Have you changed or started any medications since your last visit including any over-the-counter medicines, vitamins, or herbal medicines? no   Are you having any side effects from any of your medications? -  no  Have you stopped taking any of your medications? Is so, why? -  no    Have you seen any other physician or provider since your last visit? Yes - Records Obtained  Have you had any other diagnostic tests since your last visit? No  Have you been seen in the emergency room and/or had an admission to a hospital since we last saw you? No  Have you had your routine dental cleaning in the past 6 months? no    Have you activated your [employer] account? If not, what are your barriers? Yes     Patient Care Team:  Shirley Mitchell APRN - CNP as PCP - General (Internal Medicine)  Shirley Mitchell APRN - CNP as PCP - Empaneled Provider    Medical History Review  Past Medical, Family, and Social History reviewed and does contribute to the patient presenting condition    Health Maintenance   Topic Date Due    Colorectal Cancer Screen  Never done    Respiratory Syncytial Virus (RSV) Pregnant or age 60 yrs+ (1 - 1-dose 60+ series) Never done    DTaP/Tdap/Td vaccine (1 - Tdap) 09/21/2014    Shingles vaccine (2 of 2) 12/05/2023    Annual Wellness Visit (Medicare Advantage)  01/01/2024    Depression Monitoring  01/04/2025    Lipids  12/15/2028    Flu vaccine  Completed    Pneumococcal 65+ years Vaccine  Completed    COVID-19 Vaccine  Completed    Hepatitis C screen  Completed    Hepatitis A vaccine  Aged Out    Hepatitis B vaccine  Aged Out    Hib vaccine  Aged Out    Polio vaccine  Aged Out    Meningococcal (ACWY) vaccine  Aged Out    Depression Screen  Discontinued        [facility]  [address] [address]  Dept: [phone]  Dept Fax: [phone]    Office Progress/Follow Up Note  Date of patient's visit: 2/6/2024   Patient's Name:  Andrea Garcia  Date of

## 2024-02-14 NOTE — TELEPHONE ENCOUNTER
Pharmacy requesting refill of rivastigmine 4.5 mg.      Medication active on med list yes      Date of last Rx: 12/15/2023 with 3 refills          verified by BARBARA BENITEZ      Date of last appointment 12/15/2023    Next Visit Date:  4/4/2024

## 2024-02-15 RX ORDER — RIVASTIGMINE TARTRATE 4.5 MG/1
4.5 CAPSULE ORAL 2 TIMES DAILY
Qty: 180 CAPSULE | Refills: 1 | Status: SHIPPED | OUTPATIENT
Start: 2024-02-15

## 2024-03-14 ENCOUNTER — HOSPITAL ENCOUNTER (OUTPATIENT)
Dept: MRI IMAGING | Age: 69
Discharge: HOME | End: 2024-03-16
Attending: PSYCHIATRY & NEUROLOGY
Payer: MEDICARE

## 2024-03-14 DIAGNOSIS — F10.27 DEMENTIA ASSOCIATED WITH ALCOHOLISM WITHOUT BEHAVIORAL DISTURBANCE (HCC): ICD-10-CM

## 2024-03-14 PROCEDURE — 70551 MRI BRAIN STEM W/O DYE: CPT

## 2024-03-25 ENCOUNTER — TELEPHONE (OUTPATIENT)
Dept: INTERNAL MEDICINE CLINIC | Age: 69
End: 2024-03-25

## 2024-05-14 ENCOUNTER — OFFICE VISIT (OUTPATIENT)
Dept: INTERNAL MEDICINE CLINIC | Age: 69
End: 2024-05-14
Payer: MEDICARE

## 2024-05-14 VITALS
HEART RATE: 59 BPM | OXYGEN SATURATION: 96 % | DIASTOLIC BLOOD PRESSURE: 80 MMHG | SYSTOLIC BLOOD PRESSURE: 120 MMHG | BODY MASS INDEX: 25.27 KG/M2 | WEIGHT: 148 LBS | HEIGHT: 64 IN

## 2024-05-14 DIAGNOSIS — Z00.00 MEDICARE ANNUAL WELLNESS VISIT, SUBSEQUENT: Primary | ICD-10-CM

## 2024-05-14 PROCEDURE — G0439 PPPS, SUBSEQ VISIT: HCPCS | Performed by: NURSE PRACTITIONER

## 2024-05-14 PROCEDURE — 3074F SYST BP LT 130 MM HG: CPT | Performed by: NURSE PRACTITIONER

## 2024-05-14 PROCEDURE — 1123F ACP DISCUSS/DSCN MKR DOCD: CPT | Performed by: NURSE PRACTITIONER

## 2024-05-14 PROCEDURE — 3079F DIAST BP 80-89 MM HG: CPT | Performed by: NURSE PRACTITIONER

## 2024-05-14 RX ORDER — LISINOPRIL 40 MG/1
40 TABLET ORAL DAILY
Qty: 90 TABLET | Refills: 3 | Status: SHIPPED | OUTPATIENT
Start: 2024-05-14

## 2024-05-14 SDOH — ECONOMIC STABILITY: HOUSING INSECURITY
IN THE LAST 12 MONTHS, WAS THERE A TIME WHEN YOU DID NOT HAVE A STEADY PLACE TO SLEEP OR SLEPT IN A SHELTER (INCLUDING NOW)?: NO

## 2024-05-14 SDOH — ECONOMIC STABILITY: FOOD INSECURITY: WITHIN THE PAST 12 MONTHS, YOU WORRIED THAT YOUR FOOD WOULD RUN OUT BEFORE YOU GOT MONEY TO BUY MORE.: NEVER TRUE

## 2024-05-14 SDOH — ECONOMIC STABILITY: FOOD INSECURITY: WITHIN THE PAST 12 MONTHS, THE FOOD YOU BOUGHT JUST DIDN'T LAST AND YOU DIDN'T HAVE MONEY TO GET MORE.: NEVER TRUE

## 2024-05-14 SDOH — ECONOMIC STABILITY: INCOME INSECURITY: HOW HARD IS IT FOR YOU TO PAY FOR THE VERY BASICS LIKE FOOD, HOUSING, MEDICAL CARE, AND HEATING?: NOT HARD AT ALL

## 2024-05-14 ASSESSMENT — PATIENT HEALTH QUESTIONNAIRE - PHQ9
8. MOVING OR SPEAKING SO SLOWLY THAT OTHER PEOPLE COULD HAVE NOTICED. OR THE OPPOSITE, BEING SO FIGETY OR RESTLESS THAT YOU HAVE BEEN MOVING AROUND A LOT MORE THAN USUAL: NOT AT ALL
6. FEELING BAD ABOUT YOURSELF - OR THAT YOU ARE A FAILURE OR HAVE LET YOURSELF OR YOUR FAMILY DOWN: NOT AT ALL
4. FEELING TIRED OR HAVING LITTLE ENERGY: NOT AT ALL
SUM OF ALL RESPONSES TO PHQ9 QUESTIONS 1 & 2: 0
1. LITTLE INTEREST OR PLEASURE IN DOING THINGS: NOT AT ALL
7. TROUBLE CONCENTRATING ON THINGS, SUCH AS READING THE NEWSPAPER OR WATCHING TELEVISION: NOT AT ALL
9. THOUGHTS THAT YOU WOULD BE BETTER OFF DEAD, OR OF HURTING YOURSELF: NOT AT ALL
SUM OF ALL RESPONSES TO PHQ QUESTIONS 1-9: 0
SUM OF ALL RESPONSES TO PHQ QUESTIONS 1-9: 0
5. POOR APPETITE OR OVEREATING: NOT AT ALL
3. TROUBLE FALLING OR STAYING ASLEEP: NOT AT ALL
2. FEELING DOWN, DEPRESSED OR HOPELESS: NOT AT ALL
10. IF YOU CHECKED OFF ANY PROBLEMS, HOW DIFFICULT HAVE THESE PROBLEMS MADE IT FOR YOU TO DO YOUR WORK, TAKE CARE OF THINGS AT HOME, OR GET ALONG WITH OTHER PEOPLE: NOT DIFFICULT AT ALL
SUM OF ALL RESPONSES TO PHQ QUESTIONS 1-9: 0
SUM OF ALL RESPONSES TO PHQ QUESTIONS 1-9: 0

## 2024-05-14 NOTE — PROGRESS NOTES
Visit Information    Have you changed or started any medications since your last visit including any over-the-counter medicines, vitamins, or herbal medicines? no   Are you having any side effects from any of your medications? -  no  Have you stopped taking any of your medications? Is so, why? -  no    Have you seen any other physician or provider since your last visit? No  Have you had any other diagnostic tests since your last visit? No  Have you been seen in the emergency room and/or had an admission to a hospital since we last saw you? No  Have you had your routine dental cleaning in the past 6 months? no    Have you activated your [employer] account? If not, what are your barriers? Yes     Patient Care Team:  Shirley Mitchell APRN - CNP as PCP - General (Internal Medicine)  Shirley Mitchell APRN - CNP as PCP - Empaneled Provider    Medical History Review  Past Medical, Family, and Social History reviewed and does contribute to the patient presenting condition    Health Maintenance   Topic Date Due    Colorectal Cancer Screen  Never done    Respiratory Syncytial Virus (RSV) Pregnant or age 60 yrs+ (1 - 1-dose 60+ series) Never done    DTaP/Tdap/Td vaccine (1 - Tdap) 09/21/2014    Shingles vaccine (2 of 2) 12/05/2023    Annual Wellness Visit (Medicare Advantage)  01/01/2024    Depression Monitoring  01/04/2025    Lipids  12/15/2028    Flu vaccine  Completed    Pneumococcal 65+ years Vaccine  Completed    COVID-19 Vaccine  Completed    Hepatitis C screen  Completed    Hepatitis A vaccine  Aged Out    Hepatitis B vaccine  Aged Out    Hib vaccine  Aged Out    Polio vaccine  Aged Out    Meningococcal (ACWY) vaccine  Aged Out    Depression Screen  Discontinued    Medicare Annual Wellness Visit    Andrea Garcia is here for Medicare AWV    Assessment & Plan   Medicare annual wellness visit, subsequent  Recommendations for Preventive Services Due: see orders and patient instructions/AVS.  Recommended screening schedule

## 2024-05-14 NOTE — PATIENT INSTRUCTIONS
or in one or both shoulders or arms.     Lightheadedness or sudden weakness.     A fast or irregular heartbeat.   After you call 911, the  may tell you to chew 1 adult-strength or 2 to 4 low-dose aspirin. Wait for an ambulance. Do not try to drive yourself.  Watch closely for changes in your health, and be sure to contact your doctor if you have any problems.  Where can you learn more?  Go to https://www.[employer].net/patientEd and enter F075 to learn more about \"A Healthy Heart: Care Instructions.\"  Current as of: June 24, 2023               Content Version: 14.0  © 4072-0510 [employer].   Care instructions adapted under license by [employer]. If you have questions about a medical condition or this instruction, always ask your healthcare professional. [employer] disclaims any warranty or liability for your use of this information.      Personalized Preventive Plan for Andrea Garcia - 5/14/2024  Medicare offers a range of preventive health benefits. Some of the tests and screenings are paid in full while other may be subject to a deductible, co-insurance, and/or copay.    Some of these benefits include a comprehensive review of your medical history including lifestyle, illnesses that may run in your family, and various assessments and screenings as appropriate.    After reviewing your medical record and screening and assessments performed today your provider may have ordered immunizations, labs, imaging, and/or referrals for you.  A list of these orders (if applicable) as well as your Preventive Care list are included within your After Visit Summary for your review.    Other Preventive Recommendations:    A preventive eye exam performed by an eye specialist is recommended every 1-2 years to screen for glaucoma; cataracts, macular degeneration, and other eye disorders.  A preventive dental visit is recommended every 6 months.  Try to get at least 150 minutes of exercise

## 2024-05-15 ENCOUNTER — CLINICAL DOCUMENTATION (OUTPATIENT)
Dept: SPIRITUAL SERVICES | Age: 69
End: 2024-05-15

## 2024-05-15 NOTE — ACP (ADVANCE CARE PLANNING)
Advance Care Planning   Ambulatory ACP Specialist Patient Outreach    Date:  5/15/2024    ACP Specialist:  EVIE Boyd    Outreach call to patient in follow-up to ACP Specialist referral from:Shirley Mitchell APRN - CNP    [x] PCP  [] Provider   [] Ambulatory Care Management [] Other     For:                  [x] Advance Directive Assistance              [] Complete Portable DNR order              [] Complete POST/POLST/MOST              [] Code Status Discussion             [] Discuss Goals of Care             [x] Early ACP Decision-Making              [] Other (Specify)    Date Referral Received: 5/14/24    Next Step:   [x] ACP scheduled conversation  [x] Outreach again in one week               [] Email / Mail ACP Info Sheets  [] Email / Mail Advance Directive   [] Closing referral.  Routing closure to referring provider/staff and to ACP Specialist .    [] Closure letter mailed to patient with invitation to contact ACP Specialist if / when ready.   [] Other (Specify here):         [x] At this time, Healthcare Decision Maker Is:Olga Garcia, Spouse        [] Primary agent named in scanned advance directive.    [x] Legal Next of Kin.     [] Unable to determine legal decision maker at this time.    Outreaches:       [x] 1st -  Date:  5/15/24               Intervention:  [x] Spoke with Patient's spouse  [] Left Voice mail [x] Email / Mail    [] [employer]  [] Other (Specify) :     Outcomes: ACP Specialist tried to call two numbers listed to reach patient and patient's wife answered the home number listed as [phone]. Staff explained the ACP Referral to Olga Garcia and she agreed to schedule an ACP Conversation for patient and herself.     A phone call will be made to patient and spouse for ACP meeting on 5/22/24 at 11:00 AM. Staff verified email to send ACP information to Olga at [email or web address]            [] 2nd -  Date:                 Intervention:  [] Spoke with Patient  [] Left Voice

## 2024-05-17 ENCOUNTER — CLINICAL DOCUMENTATION (OUTPATIENT)
Dept: SPIRITUAL SERVICES | Age: 69
End: 2024-05-17

## 2024-05-22 ENCOUNTER — CLINICAL DOCUMENTATION (OUTPATIENT)
Dept: SPIRITUAL SERVICES | Age: 69
End: 2024-05-22

## 2024-05-22 NOTE — ACP (ADVANCE CARE PLANNING)
Advance Care Planning   Ambulatory ACP Specialist Patient Outreach    Date:  5/22/2024    ACP Specialist:  SAMIA Corral    Outreach call to patient in follow-up to ACP Specialist referral from:Shirley Mitchell APRN - CNP    [x] PCP  [] Provider   [] Ambulatory Care Management [] Other     For:                  [x] Advance Directive Assistance              [] Complete Portable DNR order              [] Complete POST/POLST/MOST              [] Code Status Discussion             [] Discuss Goals of Care             [] Early ACP Decision-Making              [] Other (Specify)    Date Referral Received:05/14/2024    Next Step:   [] ACP scheduled conversation  [] Outreach again in one week               [] Email / Mail ACP Info Sheets  [] Email / Mail Advance Directive   [] Closing referral.  Routing closure to referring provider/staff and to ACP Specialist .    [] Closure letter mailed to patient with invitation to contact ACP Specialist if / when ready.   [x] Other (Specify here):  in-person ACP visit requested       [] At this time, Healthcare Decision Maker Is:        [] Primary agent named in scanned advance directive.    [x] Legal Next of Kin.     [] Unable to determine legal decision maker at this time.         Outreaches:           [x] 2nd -  Date:  05/22/2024               Intervention:  [] Spoke with Patient  [] Left Voice mail [] Email / Mail    [] Charity Enginet  [x] Other (Specify) :   request to [facility]           Outcomes:Received email from pt spouse re: meeting location. Spoke with spouse by phone who indicated the need for in-person ACP visit. Request sent to [facility] and that team is attempting to make outreach to schedule. No other needs from this SW at this time.       Thank you for this referral.

## 2024-05-29 ENCOUNTER — TELEPHONE (OUTPATIENT)
Dept: SPIRITUAL SERVICES | Age: 69
End: 2024-05-29

## 2024-05-29 NOTE — TELEPHONE ENCOUNTER
Writer attempted to call patient and was unsuccessful.  Patient did not answer and writer could not leave voicemail message.

## 2024-06-04 ENCOUNTER — CLINICAL DOCUMENTATION (OUTPATIENT)
Dept: SPIRITUAL SERVICES | Age: 69
End: 2024-06-04

## 2024-06-04 NOTE — ACP (ADVANCE CARE PLANNING)
Advance Care Planning   Ambulatory ACP Specialist Patient Outreach    Date:  6/4/2024    ACP Specialist:  Sarita Hou    Outreach call to patient in follow-up to ACP Specialist referral from:Shirley Mitchell APRN - CNP    [] PCP  [] Provider   [] Ambulatory Care Management [] Other     For:                  [x] Advance Directive Assistance              [] Complete Portable DNR order              [] Complete POST/POLST/MOST              [] Code Status Discussion             [] Discuss Goals of Care             [] Early ACP Decision-Making              [] Other (Specify)    Date Referral Received:    Next Step:   [] ACP scheduled conversation  [x] Outreach again in one week               [] Email / Mail ACP Info Sheets  [] Email / Mail Advance Directive   [] Closing referral.  Routing closure to referring provider/staff and to ACP Specialist .    [] Closure letter mailed to patient with invitation to contact ACP Specialist if / when ready.   [] Other (Specify here):         [] At this time, Healthcare Decision Maker Is:        [] Primary agent named in scanned advance directive.    [x] Legal Next of Kin.     [] Unable to determine legal decision maker at this time.         Outreaches:       [] 1st -  Date:                 Intervention:  [] Spoke with Patient   [] Left Voice mail [] Email / Mail    [] MyChart  [] Other (Specify) :     Outcomes:           [] 2nd -  Date:                 Intervention:  [] Spoke with Patient  [] Left Voice mail [] Email / Mail    [] MyChart  [] Other (Specify) :              Outcomes:                [x] 3rd -  Date:  6/4/24              Intervention:  [] Spoke with Patient   [] Left Voice mail [] Email / Mail    [] MyChart  [] Other (Specify) :       Outcomes:WRITER WAS UNABLE TO LEAVE MESSAGE FOR PATIENT.  WRITER CALLED BOTH NUMBERS LISTED FOR PATIENT AND THERE WAS NO ANSWER.   PHONE NUMBER HAD A VOICEMAIL THAT BELONGED TO ANOTHER PERSON.   PHONE NUMBER

## 2024-06-11 ENCOUNTER — CLINICAL DOCUMENTATION (OUTPATIENT)
Dept: SPIRITUAL SERVICES | Age: 69
End: 2024-06-11

## 2024-06-11 NOTE — ACP (ADVANCE CARE PLANNING)
Advance Care Planning   Ambulatory ACP Specialist Patient Outreach    Date:  6/11/2024    ACP Specialist:  Sarita Hou    Outreach call to patient in follow-up to ACP Specialist referral from:Shirley Mitchell APRN - CNP    [] PCP  [] Provider   [] Ambulatory Care Management [] Other     For:                  [x] Advance Directive Assistance              [] Complete Portable DNR order              [] Complete POST/POLST/MOST              [] Code Status Discussion             [] Discuss Goals of Care             [] Early ACP Decision-Making              [] Other (Specify)    Date Referral Received:5/17/24    Next Step:   [] ACP scheduled conversation  [] Outreach again in one week               [] Email / Mail ACP Info Sheets  [] Email / Mail Advance Directive   [x] Closing referral.  Routing closure to referring provider/staff and to ACP Specialist .    [] Closure letter mailed to patient with invitation to contact ACP Specialist if / when ready.   [] Other (Specify here):         [] At this time, Healthcare Decision Maker Is:        [] Primary agent named in scanned advance directive.    [x] Legal Next of Kin.     [] Unable to determine legal decision maker at this time.         Outreaches:       [] 1st -  Date:                 Intervention:  [] Spoke with Patient   [] Left Voice mail [] Email / Mail    [] MyChart  [] Other (Specify) :     Outcomes:           [] 2nd -  Date:                 Intervention:  [] Spoke with Patient  [] Left Voice mail [] Email / Mail    [] MyChart  [] Other (Specify) :              Outcomes:                [] 3rd -  Date:                Intervention:  [] Spoke with Patient   [] Left Voice mail [] Email / Mail    [] MyChart  [] Other (Specify) :       Outcomes:           [x]  Additional Outreach -  Date:  6/11/24   (Specify Dates & special circumstances):    Outcomes: Writer spoke with patient on the telephone.  Patient stated he is not interested in completed ACP

## 2024-07-09 DIAGNOSIS — I10 HYPERTENSION GOAL BP (BLOOD PRESSURE) < 130/80: ICD-10-CM

## 2024-07-09 RX ORDER — HYDROCHLOROTHIAZIDE 25 MG/1
25 TABLET ORAL EVERY MORNING
Qty: 30 TABLET | Refills: 5 | Status: SHIPPED | OUTPATIENT
Start: 2024-07-09

## 2024-08-11 DIAGNOSIS — N40.0 BENIGN PROSTATIC HYPERPLASIA, UNSPECIFIED WHETHER LOWER URINARY TRACT SYMPTOMS PRESENT: ICD-10-CM

## 2024-08-13 RX ORDER — TAMSULOSIN HYDROCHLORIDE 0.4 MG/1
0.4 CAPSULE ORAL DAILY
Qty: 30 CAPSULE | Refills: 5 | Status: SHIPPED | OUTPATIENT
Start: 2024-08-13

## 2024-08-22 ENCOUNTER — TELEPHONE (OUTPATIENT)
Dept: INTERNAL MEDICINE CLINIC | Age: 69
End: 2024-08-22

## 2024-08-22 ENCOUNTER — TELEPHONE (OUTPATIENT)
Dept: NEUROLOGY | Age: 69
End: 2024-08-22

## 2024-08-22 NOTE — TELEPHONE ENCOUNTER
This change in condition is very concerning - needs to go to ER to see what's going on, if he hasn't eaten or drank anything since Monday I'm sure he is dehydrated.

## 2024-08-22 NOTE — TELEPHONE ENCOUNTER
Patient's wife called stating patient hasn't ate anything since Monday and is not drinking any fluids as well. States patient just wants to sleep.    Wife would like to know if there is any medication that you can prescribe to increase his appetite.    Please advise

## 2024-08-22 NOTE — TELEPHONE ENCOUNTER
Patient's wife Yolanda called the office this morning stating that she needed to get Andrea in to see Dr. Carter.  Wife states that he is \"lethargic, can't eat, can't sleep\" and something needs to be done, she needs help.  Writer explained that we have only seen Andrea one time as a new patient on 12/15/23.  Writer explained that Dr. Carter is out of the office this week and is booking out past the end of the year.  Writer suggested that she contact his PCP or go to the ER for evaluation.  Wife stated \"I figured this call would get me nowhere\" and disconnected the call.

## 2024-11-14 RX ORDER — VERAPAMIL HYDROCHLORIDE 120 MG/1
120 TABLET, FILM COATED, EXTENDED RELEASE ORAL NIGHTLY
Qty: 90 TABLET | Refills: 1 | Status: SHIPPED | OUTPATIENT
Start: 2024-11-14

## 2025-01-02 ENCOUNTER — OFFICE VISIT (OUTPATIENT)
Dept: NEUROLOGY | Age: 70
End: 2025-01-02
Payer: MEDICARE

## 2025-01-02 VITALS
BODY MASS INDEX: 26.29 KG/M2 | WEIGHT: 154 LBS | SYSTOLIC BLOOD PRESSURE: 160 MMHG | HEIGHT: 64 IN | DIASTOLIC BLOOD PRESSURE: 91 MMHG | HEART RATE: 56 BPM

## 2025-01-02 DIAGNOSIS — F10.27 DEMENTIA ASSOCIATED WITH ALCOHOLISM WITHOUT BEHAVIORAL DISTURBANCE (HCC): Primary | ICD-10-CM

## 2025-01-02 DIAGNOSIS — F09 COGNITIVE DISORDER: ICD-10-CM

## 2025-01-02 PROCEDURE — 99214 OFFICE O/P EST MOD 30 MIN: CPT | Performed by: PSYCHIATRY & NEUROLOGY

## 2025-01-02 PROCEDURE — 3080F DIAST BP >= 90 MM HG: CPT | Performed by: PSYCHIATRY & NEUROLOGY

## 2025-01-02 PROCEDURE — 3077F SYST BP >= 140 MM HG: CPT | Performed by: PSYCHIATRY & NEUROLOGY

## 2025-01-02 PROCEDURE — 1159F MED LIST DOCD IN RCRD: CPT | Performed by: PSYCHIATRY & NEUROLOGY

## 2025-01-02 PROCEDURE — 1123F ACP DISCUSS/DSCN MKR DOCD: CPT | Performed by: PSYCHIATRY & NEUROLOGY

## 2025-01-02 RX ORDER — RIVASTIGMINE TARTRATE 4.5 MG/1
4.5 CAPSULE ORAL 2 TIMES DAILY
Qty: 180 CAPSULE | Refills: 3 | Status: SHIPPED | OUTPATIENT
Start: 2025-01-02

## 2025-01-02 ASSESSMENT — ENCOUNTER SYMPTOMS
ALLERGIC/IMMUNOLOGIC NEGATIVE: 1
GASTROINTESTINAL NEGATIVE: 1
RESPIRATORY NEGATIVE: 1
EYES NEGATIVE: 1

## 2025-01-02 NOTE — PROGRESS NOTES
Financial Resource Strain: Low Risk  (5/14/2024)    Overall Financial Resource Strain (CARDIA)     Difficulty of Paying Living Expenses: Not hard at all   Food Insecurity: No Food Insecurity (5/14/2024)    Hunger Vital Sign     Worried About Running Out of Food in the Last Year: Never true     Ran Out of Food in the Last Year: Never true   Transportation Needs: Unknown (5/14/2024)    PRAPARE - Transportation     Lack of Transportation (Non-Medical): No   Physical Activity: Inactive (5/14/2024)    Exercise Vital Sign     Days of Exercise per Week: 0 days     Minutes of Exercise per Session: 0 min   Social Connections: Unknown (4/2/2021)    Received from [facility]    Social Connection and Isolation Panel [NHANES]     Frequency of Social Gatherings with Friends and Family: Patient declined   Housing Stability: Unknown (5/14/2024)    Housing Stability Vital Sign     Unstable Housing in the Last Year: No       Current Outpatient Medications   Medication Sig Dispense Refill    rivastigmine (EXELON) 4.5 MG capsule Take 1 capsule by mouth 2 times daily 180 capsule 3    verapamil (CALAN SR) 120 MG extended release tablet Take 1 tablet by mouth nightly 90 tablet 1    tamsulosin (FLOMAX) 0.4 MG capsule TAKE 1 CAPSULE BY MOUTH DAILY 30 capsule 5    hydroCHLOROthiazide (HYDRODIURIL) 25 MG tablet TAKE 1 TABLET BY MOUTH EVERY MORNING 30 tablet 5    lisinopril (PRINIVIL;ZESTRIL) 40 MG tablet Take 1 tablet by mouth daily 90 tablet 3    PARoxetine (PAXIL) 10 MG tablet Take 1 tablet by mouth daily 90 tablet 3    aspirin 81 MG EC tablet Take 1 tablet by mouth daily       No current facility-administered medications for this visit.       No Known Allergies      Review of Systems     Vitals:    01/02/25 1318   BP: (!) 160/91   Pulse: 56     weight: 69.9 kg (154 lb)      Review of Systems   Constitutional: Negative.    HENT: Negative.     Eyes: Negative.    Respiratory: Negative.     Cardiovascular: Negative.

## 2025-01-21 DIAGNOSIS — F32.9 REACTIVE DEPRESSION: ICD-10-CM

## 2025-01-21 RX ORDER — PAROXETINE 10 MG/1
10 TABLET, FILM COATED ORAL DAILY
Qty: 90 TABLET | Refills: 3 | Status: SHIPPED | OUTPATIENT
Start: 2025-01-21

## 2025-01-22 DIAGNOSIS — N40.0 BENIGN PROSTATIC HYPERPLASIA, UNSPECIFIED WHETHER LOWER URINARY TRACT SYMPTOMS PRESENT: ICD-10-CM

## 2025-01-22 RX ORDER — TAMSULOSIN HYDROCHLORIDE 0.4 MG/1
0.4 CAPSULE ORAL DAILY
Qty: 30 CAPSULE | Refills: 5 | Status: SHIPPED | OUTPATIENT
Start: 2025-01-22

## 2025-01-30 DIAGNOSIS — I10 HYPERTENSION GOAL BP (BLOOD PRESSURE) < 130/80: ICD-10-CM

## 2025-01-30 RX ORDER — HYDROCHLOROTHIAZIDE 25 MG/1
25 TABLET ORAL EVERY MORNING
Qty: 30 TABLET | Refills: 1 | Status: SHIPPED | OUTPATIENT
Start: 2025-01-30

## 2025-01-31 DIAGNOSIS — I10 HYPERTENSION GOAL BP (BLOOD PRESSURE) < 130/80: ICD-10-CM

## 2025-01-31 RX ORDER — HYDROCHLOROTHIAZIDE 25 MG/1
25 TABLET ORAL EVERY MORNING
Qty: 30 TABLET | Refills: 1 | OUTPATIENT
Start: 2025-01-31

## 2025-03-10 ENCOUNTER — TELEPHONE (OUTPATIENT)
Dept: INTERNAL MEDICINE CLINIC | Age: 70
End: 2025-03-10

## 2025-03-10 NOTE — TELEPHONE ENCOUNTER
Patient's wife called trying to schedule a hospital follow up. Patient was sent home on 3/9/25.    Please advise if we can over book.

## 2025-03-11 NOTE — TELEPHONE ENCOUNTER
Attempted to contact patients wife, no answer, [facility] has 2 open appointments Thursday. 10 AM or 2 PM

## 2025-03-12 ENCOUNTER — TELEPHONE (OUTPATIENT)
Dept: INTERNAL MEDICINE CLINIC | Age: 70
End: 2025-03-12

## 2025-03-12 NOTE — TELEPHONE ENCOUNTER
I have been trying to contact his wife to schedule a hospital follow up for tomorrow and her phone has been going to voicemail. Have left a voicemail x2

## 2025-03-12 NOTE — TELEPHONE ENCOUNTER
Patients wife called and stated that patient was discharged from [facility] on 3/9/2025 and needs to see a GI doctor as soon as possible for a bleeding ulcer. Please advise.

## 2025-03-12 NOTE — TELEPHONE ENCOUNTER
[facility] called in regards to verifying if Shirley Mitchell will be the following physician for home care orders.

## 2025-03-13 NOTE — TELEPHONE ENCOUNTER
I can see a referral to GI placed on 3/10 from [facility] (in [facility]).   Please make sure patient/wife has the contact info to schedule.

## 2025-03-20 ENCOUNTER — OFFICE VISIT (OUTPATIENT)
Dept: PODIATRY | Age: 70
End: 2025-03-20

## 2025-03-20 VITALS — HEIGHT: 64 IN | WEIGHT: 154 LBS | BODY MASS INDEX: 26.29 KG/M2

## 2025-03-20 DIAGNOSIS — L98.9 BENIGN SKIN LESION: Primary | ICD-10-CM

## 2025-03-20 DIAGNOSIS — M79.672 PAIN IN LEFT FOOT: ICD-10-CM

## 2025-03-20 RX ORDER — PANTOPRAZOLE SODIUM 40 MG/1
40 TABLET, DELAYED RELEASE ORAL 2 TIMES DAILY
COMMUNITY
Start: 2025-03-10

## 2025-03-20 RX ORDER — LORATADINE 10 MG/1
10 TABLET ORAL DAILY
COMMUNITY

## 2025-03-22 ASSESSMENT — ENCOUNTER SYMPTOMS
BACK PAIN: 0
COLOR CHANGE: 0
NAUSEA: 0
DIARRHEA: 0
SHORTNESS OF BREATH: 0

## 2025-03-22 NOTE — PROGRESS NOTES
[facility] Podiatry  Return Patient Progress Note    Subjective: Andrea Garcia 69 y.o. male that presents for follow up evaluation of painful callus left foot  Chief Complaint   Patient presents with    benign lesion     Left foot    Patient's treatment thus far has included debridement.  Pain is rated 9 out of 10 and is described as intermittent. Patient was seen more than 1 year ago for this condition.  Patient states that it was okay for a while, but it has returned.    Review of Systems   Constitutional:  Negative for activity change, appetite change, chills, diaphoresis, fatigue and fever.   Respiratory:  Negative for shortness of breath.    Cardiovascular:  Negative for leg swelling.   Gastrointestinal:  Negative for diarrhea and nausea.   Endocrine: Negative for cold intolerance, heat intolerance and polyuria.   Musculoskeletal:  Positive for arthralgias. Negative for back pain, gait problem, joint swelling and myalgias.   Skin:  Negative for color change, pallor, rash and wound.   Allergic/Immunologic: Negative for environmental allergies and food allergies.   Neurological:  Negative for dizziness, weakness, light-headedness and numbness.   Hematological:  Does not bruise/bleed easily.   Psychiatric/Behavioral:  Negative for behavioral problems, confusion and self-injury. The patient is not nervous/anxious.        Objective: Clinical evaluation of the patient reveals hyperkeratotic tissue formation x 2 to the plantar aspect of the left heel. There is pain with direct palpation of the lesion(s). Debridement of the lesion(s) with a fifteen blade does reveal a central core. The core of the lesion(s) was debrided with a fifteen blade. No signs of bacterial infection are noted to the lesion(s).     Assessment:    Diagnosis Orders   1. Benign skin lesion  DESTRUC BENIGN LESION, UP TO 14 LESIONS      2. Pain in left foot  DESTRUC BENIGN LESION, UP TO 14 LESIONS            Plan: 1. Clinical evaluation of

## 2025-04-03 ENCOUNTER — OFFICE VISIT (OUTPATIENT)
Dept: INTERNAL MEDICINE CLINIC | Age: 70
End: 2025-04-03

## 2025-04-03 VITALS
WEIGHT: 156 LBS | SYSTOLIC BLOOD PRESSURE: 118 MMHG | HEIGHT: 64 IN | DIASTOLIC BLOOD PRESSURE: 64 MMHG | BODY MASS INDEX: 26.63 KG/M2

## 2025-04-03 DIAGNOSIS — K25.3 ACUTE PEPTIC ULCER OF STOMACH: Primary | ICD-10-CM

## 2025-04-03 DIAGNOSIS — F10.27 DEMENTIA ASSOCIATED WITH ALCOHOLISM WITHOUT BEHAVIORAL DISTURBANCE: ICD-10-CM

## 2025-04-03 DIAGNOSIS — D50.0 BLOOD LOSS ANEMIA: ICD-10-CM

## 2025-04-03 DIAGNOSIS — I10 HYPERTENSION, BENIGN: ICD-10-CM

## 2025-04-03 PROBLEM — K25.9 GASTRIC PEPTIC ULCER: Status: ACTIVE | Noted: 2025-04-03

## 2025-04-03 SDOH — ECONOMIC STABILITY: FOOD INSECURITY: WITHIN THE PAST 12 MONTHS, THE FOOD YOU BOUGHT JUST DIDN'T LAST AND YOU DIDN'T HAVE MONEY TO GET MORE.: NEVER TRUE

## 2025-04-03 SDOH — ECONOMIC STABILITY: FOOD INSECURITY: WITHIN THE PAST 12 MONTHS, YOU WORRIED THAT YOUR FOOD WOULD RUN OUT BEFORE YOU GOT MONEY TO BUY MORE.: NEVER TRUE

## 2025-04-03 ASSESSMENT — PATIENT HEALTH QUESTIONNAIRE - PHQ9
SUM OF ALL RESPONSES TO PHQ QUESTIONS 1-9: 0
4. FEELING TIRED OR HAVING LITTLE ENERGY: NOT AT ALL
SUM OF ALL RESPONSES TO PHQ QUESTIONS 1-9: 0
1. LITTLE INTEREST OR PLEASURE IN DOING THINGS: NOT AT ALL
7. TROUBLE CONCENTRATING ON THINGS, SUCH AS READING THE NEWSPAPER OR WATCHING TELEVISION: NOT AT ALL
2. FEELING DOWN, DEPRESSED OR HOPELESS: NOT AT ALL
SUM OF ALL RESPONSES TO PHQ QUESTIONS 1-9: 0
3. TROUBLE FALLING OR STAYING ASLEEP: NOT AT ALL
9. THOUGHTS THAT YOU WOULD BE BETTER OFF DEAD, OR OF HURTING YOURSELF: NOT AT ALL
8. MOVING OR SPEAKING SO SLOWLY THAT OTHER PEOPLE COULD HAVE NOTICED. OR THE OPPOSITE, BEING SO FIGETY OR RESTLESS THAT YOU HAVE BEEN MOVING AROUND A LOT MORE THAN USUAL: NOT AT ALL
6. FEELING BAD ABOUT YOURSELF - OR THAT YOU ARE A FAILURE OR HAVE LET YOURSELF OR YOUR FAMILY DOWN: NOT AT ALL
10. IF YOU CHECKED OFF ANY PROBLEMS, HOW DIFFICULT HAVE THESE PROBLEMS MADE IT FOR YOU TO DO YOUR WORK, TAKE CARE OF THINGS AT HOME, OR GET ALONG WITH OTHER PEOPLE: NOT DIFFICULT AT ALL
5. POOR APPETITE OR OVEREATING: NOT AT ALL
SUM OF ALL RESPONSES TO PHQ QUESTIONS 1-9: 0

## 2025-04-03 NOTE — PROGRESS NOTES
\"Have you been to the ER, urgent care clinic since your last visit?  Hospitalized since your last visit?\"    Yes- [facility] 3/7-3/10    “Have you seen or consulted any other health care providers outside our system since your last visit?”    NO      “Have you had a colorectal cancer screening such as a colonoscopy/FIT/Cologuard?    NO    No colonoscopy on file  No cologuard on file  No FIT/FOBT on file   No flexible sigmoidoscopy on file          
consultation with Dr. Kelly.    DIAGNOSTIC STUDIES:     EGD showed nonbleeding gastric ulcer. Status post epi injection. Treated with bipolar cautery. Clip was placed. Erythematous duodenopathy.    MEDICATIONS:      Current Outpatient Medications   Medication Sig Dispense Refill    loratadine (CLARITIN) 10 MG tablet Take 1 tablet by mouth daily      pantoprazole (PROTONIX) 40 MG tablet Take 1 tablet by mouth 2 times daily      tamsulosin (FLOMAX) 0.4 MG capsule TAKE 1 CAPSULE BY MOUTH DAILY 30 capsule 5    PARoxetine (PAXIL) 10 MG tablet TAKE 1 TABLET BY MOUTH DAILY 90 tablet 3    rivastigmine (EXELON) 4.5 MG capsule Take 1 capsule by mouth 2 times daily 180 capsule 3    verapamil (CALAN SR) 120 MG extended release tablet Take 1 tablet by mouth nightly 90 tablet 1    lisinopril (PRINIVIL;ZESTRIL) 40 MG tablet Take 1 tablet by mouth daily 90 tablet 3    hydroCHLOROthiazide (HYDRODIURIL) 25 MG tablet TAKE 1 TABLET BY MOUTH EVERY MORNING (Patient not taking: Reported on 4/3/2025) 30 tablet 1    aspirin 81 MG EC tablet Take 1 tablet by mouth daily (Patient not taking: Reported on 4/3/2025)       No current facility-administered medications for this visit.       ALLERGIES:    No Known Allergies    SOCIAL HISTORY   Reviewed and no change from previous record.     Andrea  reports that he has never smoked. He has never been exposed to tobacco smoke. He has never used smokeless tobacco.    FAMILY HISTORY:    Reviewed and No change from previous visit    REVIEW OF SYSTEMS:    Review of Systems        OBJECTIVE      Physical exam        Physical Exam   Vitals:  /64 (BP Site: Right Upper Arm)   Ht 1.626 m (5' 4\")   Wt 70.8 kg (156 lb)   BMI 26.78 kg/m²                 Body mass index is 26.78 kg/m².     General Appearance:   Alert , CO-OPERATIVE ,                 Skin:                             No rash or erythema  HEENT ;                           Head                        Symmetrical , within normal limits

## 2025-04-14 ENCOUNTER — OFFICE VISIT (OUTPATIENT)
Dept: PODIATRY | Age: 70
End: 2025-04-14
Payer: MEDICARE

## 2025-04-14 VITALS — HEIGHT: 64 IN | BODY MASS INDEX: 26.63 KG/M2 | WEIGHT: 156 LBS

## 2025-04-14 DIAGNOSIS — M79.672 PAIN IN LEFT FOOT: ICD-10-CM

## 2025-04-14 DIAGNOSIS — L84 CORNS AND CALLOSITIES: Primary | ICD-10-CM

## 2025-04-14 PROCEDURE — 11055 PARING/CUTG B9 HYPRKER LES 1: CPT | Performed by: PODIATRIST

## 2025-04-14 PROCEDURE — 99999 PR OFFICE/OUTPT VISIT,PROCEDURE ONLY: CPT | Performed by: PODIATRIST

## 2025-04-15 ASSESSMENT — ENCOUNTER SYMPTOMS
SHORTNESS OF BREATH: 0
NAUSEA: 0
BACK PAIN: 0
DIARRHEA: 0
COLOR CHANGE: 0

## 2025-04-15 NOTE — PROGRESS NOTES
[facility] Podiatry  Return Patient Progress Note    Subjective: Andrea Garcia 69 y.o. male that presents for follow up evaluation of painful callus left foot.  Chief Complaint   Patient presents with    Callouses     Left foot     Patient's treatment thus far has included serial debridement.  Pain is rated 7 out of 10 and is described as intermittent. Patient has been following my prescribed course of therapy as instructed.     Review of Systems   Constitutional:  Negative for activity change, appetite change, chills, diaphoresis, fatigue and fever.   Respiratory:  Negative for shortness of breath.    Cardiovascular:  Negative for leg swelling.   Gastrointestinal:  Negative for diarrhea and nausea.   Endocrine: Negative for cold intolerance, heat intolerance and polyuria.   Musculoskeletal:  Positive for arthralgias. Negative for back pain, gait problem, joint swelling and myalgias.   Skin:  Negative for color change, pallor, rash and wound.   Allergic/Immunologic: Negative for environmental allergies and food allergies.   Neurological:  Negative for dizziness, weakness, light-headedness and numbness.   Hematological:  Does not bruise/bleed easily.   Psychiatric/Behavioral:  Negative for behavioral problems, confusion and self-injury. The patient is not nervous/anxious.        Objective: Clinical evaluation of the patient reveals hyperkeratotic tissue formation x 2 to the plantar aspect of the left heel. There is pain with direct palpation of the lesion(s). Debridement of the lesion(s) with a fifteen blade does reveal a central core. The core of the lesion(s) was debrided with a fifteen blade. No signs of bacterial infection are noted to the lesion(s).     Assessment:    Diagnosis Orders   1. Corns and callosities  TRIM HYPERKERATOTIC SKIN LESION, ONE      2. Pain in left foot  TRIM HYPERKERATOTIC SKIN LESION, ONE            Plan: 1. Clinical evaluation of the patient. 2.  The lesion(s) to the left foot

## 2025-04-28 ENCOUNTER — TELEPHONE (OUTPATIENT)
Dept: INTERNAL MEDICINE CLINIC | Age: 70
End: 2025-04-28

## 2025-04-28 NOTE — TELEPHONE ENCOUNTER
Christa with [facility] called stating that the plan of care that was faxed on the 25th does still need signed because they did do a start of care before the family wanted to switch home care services.

## 2025-04-29 ENCOUNTER — HOSPITAL ENCOUNTER (OUTPATIENT)
Age: 70
Setting detail: SPECIMEN
Discharge: HOME | End: 2025-04-29

## 2025-04-29 DIAGNOSIS — D50.0 BLOOD LOSS ANEMIA: ICD-10-CM

## 2025-04-29 DIAGNOSIS — K25.3 ACUTE PEPTIC ULCER OF STOMACH: ICD-10-CM

## 2025-04-29 LAB
ALBUMIN SERPL-MCNC: 4 G/DL (ref 3.5–5.2)
ALBUMIN/GLOB SERPL: 1.3 {RATIO} (ref 1–2.5)
ALP SERPL-CCNC: 73 U/L (ref 40–129)
ALT SERPL-CCNC: 9 U/L (ref 10–50)
ANION GAP SERPL CALCULATED.3IONS-SCNC: 9 MMOL/L (ref 9–16)
AST SERPL-CCNC: 15 U/L (ref 10–50)
BILIRUB SERPL-MCNC: 0.4 MG/DL (ref 0–1.2)
BUN SERPL-MCNC: 16 MG/DL (ref 8–23)
CALCIUM SERPL-MCNC: 9.1 MG/DL (ref 8.6–10.4)
CHLORIDE SERPL-SCNC: 102 MMOL/L (ref 98–107)
CO2 SERPL-SCNC: 26 MMOL/L (ref 20–31)
CREAT SERPL-MCNC: 0.8 MG/DL (ref 0.7–1.2)
ERYTHROCYTE [DISTWIDTH] IN BLOOD BY AUTOMATED COUNT: 14.2 % (ref 11.8–14.4)
GFR, ESTIMATED: >90 ML/MIN/1.73M2
GLUCOSE SERPL-MCNC: 91 MG/DL (ref 74–99)
HCT VFR BLD AUTO: 34.1 % (ref 40.7–50.3)
HGB BLD-MCNC: 10.7 G/DL (ref 13–17)
MCH RBC QN AUTO: 28.3 PG (ref 25.2–33.5)
MCHC RBC AUTO-ENTMCNC: 31.4 G/DL (ref 28.4–34.8)
MCV RBC AUTO: 90.2 FL (ref 82.6–102.9)
NRBC BLD-RTO: 0 PER 100 WBC
PLATELET # BLD AUTO: 294 K/UL (ref 138–453)
PMV BLD AUTO: 9.4 FL (ref 8.1–13.5)
POTASSIUM SERPL-SCNC: 4.9 MMOL/L (ref 3.7–5.3)
PROT SERPL-MCNC: 7 G/DL (ref 6.6–8.7)
RBC # BLD AUTO: 3.78 M/UL (ref 4.21–5.77)
SODIUM SERPL-SCNC: 137 MMOL/L (ref 136–145)
WBC OTHER # BLD: 6.3 K/UL (ref 3.5–11.3)

## 2025-05-06 ENCOUNTER — TELEPHONE (OUTPATIENT)
Dept: INTERNAL MEDICINE CLINIC | Age: 70
End: 2025-05-06

## 2025-05-18 RX ORDER — LISINOPRIL 40 MG/1
40 TABLET ORAL DAILY
Qty: 90 TABLET | Refills: 0 | Status: SHIPPED | OUTPATIENT
Start: 2025-05-18

## 2025-06-10 RX ORDER — VERAPAMIL HYDROCHLORIDE 120 MG/1
120 TABLET, FILM COATED, EXTENDED RELEASE ORAL NIGHTLY
Qty: 90 TABLET | Refills: 1 | Status: SHIPPED | OUTPATIENT
Start: 2025-06-10

## 2025-07-23 ENCOUNTER — TELEPHONE (OUTPATIENT)
Dept: INTERNAL MEDICINE CLINIC | Age: 70
End: 2025-07-23

## 2025-07-26 DIAGNOSIS — N40.0 BENIGN PROSTATIC HYPERPLASIA, UNSPECIFIED WHETHER LOWER URINARY TRACT SYMPTOMS PRESENT: ICD-10-CM

## 2025-07-29 RX ORDER — TAMSULOSIN HYDROCHLORIDE 0.4 MG/1
0.4 CAPSULE ORAL DAILY
Qty: 30 CAPSULE | Refills: 5 | Status: SHIPPED | OUTPATIENT
Start: 2025-07-29

## 2025-08-21 RX ORDER — LISINOPRIL 40 MG/1
40 TABLET ORAL DAILY
Qty: 90 TABLET | Refills: 0 | Status: SHIPPED | OUTPATIENT
Start: 2025-08-21